# Patient Record
Sex: FEMALE | Race: WHITE | HISPANIC OR LATINO | Employment: FULL TIME | URBAN - METROPOLITAN AREA
[De-identification: names, ages, dates, MRNs, and addresses within clinical notes are randomized per-mention and may not be internally consistent; named-entity substitution may affect disease eponyms.]

---
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
## 2024-09-19 ENCOUNTER — APPOINTMENT (OUTPATIENT)
Dept: RADIOLOGY | Facility: CLINIC | Age: 19
End: 2024-09-19
Attending: STUDENT IN AN ORGANIZED HEALTH CARE EDUCATION/TRAINING PROGRAM
Payer: COMMERCIAL

## 2024-09-19 LAB
ALBUMIN UR-MCNC: 20 MG/DL
ANION GAP SERPL CALCULATED.3IONS-SCNC: 11 MMOL/L (ref 7–15)
APPEARANCE UR: CLEAR
BACTERIA #/AREA URNS HPF: ABNORMAL /HPF
BASOPHILS # BLD AUTO: 0.1 10E3/UL (ref 0–0.2)
BASOPHILS NFR BLD AUTO: 1 %
BILIRUB UR QL STRIP: NEGATIVE
BUN SERPL-MCNC: 12.4 MG/DL (ref 6–20)
CALCIUM SERPL-MCNC: 9.4 MG/DL (ref 8.8–10.4)
CHLORIDE SERPL-SCNC: 104 MMOL/L (ref 98–107)
COLOR UR AUTO: ABNORMAL
CREAT SERPL-MCNC: 0.56 MG/DL (ref 0.51–0.95)
EGFRCR SERPLBLD CKD-EPI 2021: >90 ML/MIN/1.73M2
EOSINOPHIL # BLD AUTO: 0.1 10E3/UL (ref 0–0.7)
EOSINOPHIL NFR BLD AUTO: 1 %
ERYTHROCYTE [DISTWIDTH] IN BLOOD BY AUTOMATED COUNT: 12 % (ref 10–15)
GLUCOSE SERPL-MCNC: 91 MG/DL (ref 70–99)
GLUCOSE UR STRIP-MCNC: NEGATIVE MG/DL
HCG UR QL: NEGATIVE
HCO3 SERPL-SCNC: 26 MMOL/L (ref 22–29)
HCT VFR BLD AUTO: 39.2 % (ref 35–47)
HGB BLD-MCNC: 13.6 G/DL (ref 11.7–15.7)
HGB UR QL STRIP: NEGATIVE
IMM GRANULOCYTES # BLD: 0 10E3/UL
IMM GRANULOCYTES NFR BLD: 0 %
KETONES UR STRIP-MCNC: NEGATIVE MG/DL
LEUKOCYTE ESTERASE UR QL STRIP: NEGATIVE
LYMPHOCYTES # BLD AUTO: 2.8 10E3/UL (ref 0.8–5.3)
LYMPHOCYTES NFR BLD AUTO: 37 %
MCH RBC QN AUTO: 30 PG (ref 26.5–33)
MCHC RBC AUTO-ENTMCNC: 34.7 G/DL (ref 31.5–36.5)
MCV RBC AUTO: 86 FL (ref 78–100)
MONOCYTES # BLD AUTO: 0.5 10E3/UL (ref 0–1.3)
MONOCYTES NFR BLD AUTO: 7 %
MUCOUS THREADS #/AREA URNS LPF: PRESENT /LPF
NEUTROPHILS # BLD AUTO: 4.3 10E3/UL (ref 1.6–8.3)
NEUTROPHILS NFR BLD AUTO: 55 %
NITRATE UR QL: NEGATIVE
NRBC # BLD AUTO: 0 10E3/UL
NRBC BLD AUTO-RTO: 0 /100
PH UR STRIP: 6 [PH] (ref 5–7)
PLATELET # BLD AUTO: 251 10E3/UL (ref 150–450)
POTASSIUM SERPL-SCNC: 3.7 MMOL/L (ref 3.4–5.3)
RBC # BLD AUTO: 4.54 10E6/UL (ref 3.8–5.2)
RBC URINE: 1 /HPF
SODIUM SERPL-SCNC: 141 MMOL/L (ref 135–145)
SP GR UR STRIP: 1.03 (ref 1–1.03)
SQUAMOUS EPITHELIAL: 3 /HPF
UROBILINOGEN UR STRIP-MCNC: <2 MG/DL
WBC # BLD AUTO: 7.8 10E3/UL (ref 4–11)
WBC URINE: 1 /HPF

## 2024-09-19 PROCEDURE — 81025 URINE PREGNANCY TEST: CPT | Performed by: FAMILY MEDICINE

## 2024-09-19 PROCEDURE — 99284 EMERGENCY DEPT VISIT MOD MDM: CPT

## 2024-09-19 PROCEDURE — 74019 RADEX ABDOMEN 2 VIEWS: CPT

## 2024-09-19 PROCEDURE — 85025 COMPLETE CBC W/AUTO DIFF WBC: CPT | Performed by: FAMILY MEDICINE

## 2024-09-19 PROCEDURE — 36415 COLL VENOUS BLD VENIPUNCTURE: CPT | Performed by: FAMILY MEDICINE

## 2024-09-19 PROCEDURE — 81001 URINALYSIS AUTO W/SCOPE: CPT | Performed by: FAMILY MEDICINE

## 2024-09-19 PROCEDURE — 80048 BASIC METABOLIC PNL TOTAL CA: CPT | Performed by: FAMILY MEDICINE

## 2024-09-19 ASSESSMENT — COLUMBIA-SUICIDE SEVERITY RATING SCALE - C-SSRS
2. HAVE YOU ACTUALLY HAD ANY THOUGHTS OF KILLING YOURSELF IN THE PAST MONTH?: NO
6. HAVE YOU EVER DONE ANYTHING, STARTED TO DO ANYTHING, OR PREPARED TO DO ANYTHING TO END YOUR LIFE?: NO
1. IN THE PAST MONTH, HAVE YOU WISHED YOU WERE DEAD OR WISHED YOU COULD GO TO SLEEP AND NOT WAKE UP?: NO

## 2024-09-20 ENCOUNTER — HOSPITAL ENCOUNTER (EMERGENCY)
Facility: CLINIC | Age: 19
Discharge: HOME | End: 2024-09-20
Attending: STUDENT IN AN ORGANIZED HEALTH CARE EDUCATION/TRAINING PROGRAM | Admitting: STUDENT IN AN ORGANIZED HEALTH CARE EDUCATION/TRAINING PROGRAM
Payer: COMMERCIAL

## 2024-09-20 VITALS
TEMPERATURE: 98.5 F | HEART RATE: 76 BPM | RESPIRATION RATE: 18 BRPM | DIASTOLIC BLOOD PRESSURE: 77 MMHG | SYSTOLIC BLOOD PRESSURE: 128 MMHG | BODY MASS INDEX: 21.34 KG/M2 | HEIGHT: 61 IN | WEIGHT: 113 LBS | OXYGEN SATURATION: 98 %

## 2024-09-20 DIAGNOSIS — K62.5 BRIGHT RED BLOOD PER RECTUM: ICD-10-CM

## 2024-09-20 DIAGNOSIS — K59.00 CONSTIPATION, UNSPECIFIED CONSTIPATION TYPE: ICD-10-CM

## 2024-09-20 ASSESSMENT — ACTIVITIES OF DAILY LIVING (ADL): ADLS_ACUITY_SCORE: 33

## 2024-09-20 NOTE — DISCHARGE INSTRUCTIONS
You were seen in the emergency department today for evaluation of bright red blood in your stool.  As discussed, in the setting of constipation and straining, it is common for people to form hemorrhoids.  I did an exam here today and did not see any external hemorrhoids but I suspect you likely have internal hemorrhoids that are causing the bright light blood in your stool.  Your lab work today is all unremarkable and reassuring.  Your abdominal xray found a moderate stool burden, indicating that you are constipated.  I recommend you start taking a daily fiber supplement such as Metamucil.  You can pick this up over-the-counter.  You can also try any over-the-counter stool softeners such as Colace or senna  You should also try to push fluids and stay hydrated as as it can help with more regular bowel movements.    Follow up with your primary care office next week for recheck.  Return to the emergency department if you develop any severe abdominal pain, high fevers, severe worsened bleeding, or dark liquid black stool.

## 2024-09-20 NOTE — ED PROVIDER NOTES
Emergency Department Encounter   NAME: Tamiko Lane ; AGE: 19 year old female ; YOB: 2005 ; MRN: [id number] ; PCP: Leticia Arreaga   ED PROVIDER: Deepali Green PA-C    Evaluation Date & Time:   No admission date for patient encounter.    CHIEF COMPLAINT:  Abdominal Pain        Impression and Plan   FINAL IMPRESSION:    ICD-10-CM    1. Constipation, unspecified constipation type  K59.00       2. Bright red blood per rectum  K62.5           ED Course and Medical Decision Making  Tamiko is a 19 year old female with PMH of GERD, MDD, and lactose intolerance presenting to the emergency department for evaluation of left lower quadrant pain that has been intermittent over the past month. Pain occasionally improves with a bowel movement. She states straining with bowel movements and some blood in the stool/toilet.  The past 2 days she has also had bright red blood streaked in her stool and on the toilet paper.  Endorses some pain with bowel movements as well. Denies any nausea or vomiting. Patient called nurse triage line earlier today to schedule with PCP, was sent to the ER for further evaluation. Pain at its worst is 4/10.  No history of abdominal surgery.  She states she usually has irregular periods, last was beginning of August.  No vaginal discharge or odor.  No concern for sexually transmitted infection.    Vitals reviewed and unremarkable. On exam she is resting comfortably, nontoxic-appearing.  Not pale or diaphoretic. Differential diagnosis includes but not limited to constipation, diarrhea, IBS, UTI, pyelonephritis, appendicitis, ovarian cyst, ectopic pregnancy, diverticulitis, SBO, ovarian torsion. CBC is without leukocytosis or anemia.  BMP is within normal range.  No evidence of DEBRA.  UA is without evidence of UTI or blood in the urine.  Serum hCG is negative. I performed rectal exam in the emergency department, there is no evidence of external hemorrhoid.  Internal exam was not performed to  evaluate for internal hemorrhoid.  Discussed imaging options with the patient including no imaging, abdominal x-ray, and CT. Patient would like to start with abdominal x-ray to evaluate for stool burden and SBO which I think is a reasonable start.  On exam she has very mild tenderness in the left lower quadrant but the abdomen is soft and nondistended.  She does not have any right lower quadrant tenderness.  I think appendicitis is unlikely given the left lower quadrant tenderness and the fact that her pain has been on and off for the past month or so. I think ovarian torsion is unlikely given patient endorses 4/10 pain at its max and this has been ongoing for quite some time.    Abdominal x-ray finds moderate stool burden consistent with constipation.  No free peritoneal or portal venous gas. Given patient's age and history of constipation and straining with new bright red blood per rectum I suspect she most likely has hemorrhoids causing her symptoms.  I recommended she  a stool softener over-the-counter such as Colace and use this daily until she starts having regular daily bowel movements.  She can then stop the stool softener and do daily fiber supplement to help with more regular bowel movements and to prevent/reduce hemorrhoids.  Patient has follow-up with her primary care office scheduled for next week.  She was educated on concerning symptoms that warrant return to the emergency department and is understanding and agreeable to this plan.        History:  Obtained supplemental history:Supplemental history obtained?: Documented in chart and Family Member/Significant Other  Reviewed external records: External records reviewed?: No    Complicating Factors:  Care impacted by chronic illness:N/A  Care significantly affected by social determinants of health:N/A    Work Up:  Did you consider but not order tests?: In addition to work-up documented, I considered the following work up: CT abdomen pelvis,  transvaginal ultrasound with Doppler  Did you interpret images independently?: Independent interpretation of ECG and images noted in documentation, when applicable.    External Consults:  Consultation discussion with other provider:Did you involve another provider (consultant, , pharmacy, etc.)?: No  Discharge. No recommendations on prescription strength medication(s). See documentation for any additional details.  I considered escalation of care and admitting the patient but ultimately did not given reassuring exam and workup.          ED COURSE:  9:05 PM I met and introduced myself to the patient. I gathered initial history and performed my physical exam. We discussed plan for initial workup.   12:55 AM I rechecked the patient and discussed results, discharge, follow up, and reasons to return to the ED.     At the conclusion of the encounter I discussed the results of all the tests and the disposition. The questions were answered. The patient or family acknowledged understanding and was agreeable with the care plan.      MEDICATIONS GIVEN IN THE EMERGENCY DEPARTMENT:  Medications - No data to display      NEW PRESCRIPTIONS STARTED AT TODAY'S ED VISIT:  New Prescriptions    No medications on file         HPI   Patient information was obtained from: patient  Use of Intrepreter: N/A     Tamiko is a 19 year old female with PMH of GERD, MDD, and lactose intolerance presenting to the emergency department for evaluation of left lower quadrant pain that has been intermittent over the past month. Pain occasionally improves with a bowel movement. She states straining with bowel movements and some blood in the stool/toilet.  The past 2 days she has also had bright red blood streaked in her stool and on the toilet paper.  Endorses some pain with bowel movements as well. Denies any nausea or vomiting. Patient called nurse triage line earlier today to schedule with PCP, was sent to the ER for further evaluation. Pain at its  "worst is 4/10.  No history of abdominal surgery.  She states she usually has irregular periods, last was beginning of August.  No vaginal discharge or odor.  No concern for sexually transmitted infection.      Medical History     No past medical history on file.    No past surgical history on file.    No family history on file.    Social History     Tobacco Use    Smoking status: Never    Smokeless tobacco: Never   Substance Use Topics    Alcohol use: Yes    Drug use: Yes       No current outpatient medications on file.        Physical Exam     First Vitals:  Patient Vitals for the past 24 hrs:   BP Temp Pulse Resp SpO2 Height Weight   09/19/24 2103 128/77 98.5  F (36.9  C) 78 19 98 % 1.549 m (5' 1\") 51.3 kg (113 lb)         PHYSICAL EXAM    General Appearance:  Alert, cooperative, no distress, appears stated age  HENT: Normocephalic without obvious deformity, atraumatic. Mucous membranes moist   Eyes: Conjunctiva clear, Lids normal. No discharge.   Respiratory: No distress. Lungs clear to ausculation bilaterally. No wheezes, rhonchi or stridor  Cardiovascular: Regular rate and rhythm, no murmur. Normal cap refill. No peripheral edema  GI: Abdomen soft, nondistended.  She has mild reproducible tenderness in the left lower quadrant, no other areas of tenderness.  : Mild left-sided CVA tenderness, no right-sided CVA tenderness.  External rectal exam does not find any evidence of hemorrhoids.  Musculoskeletal: Moving all extremities. No gross deformities  Integument: Warm, dry, no rashes or lesions  Neurologic: Alert and orientated x3. No focal deficits.  Psych: Normal mood and affect        Results     LAB:  All pertinent labs reviewed and interpreted  Labs Ordered and Resulted from Time of ED Arrival to Time of ED Departure   ROUTINE UA WITH MICROSCOPIC REFLEX TO CULTURE - Abnormal       Result Value    Color Urine Light Yellow      Appearance Urine Clear      Glucose Urine Negative      Bilirubin Urine Negative   "    Ketones Urine Negative      Specific Gravity Urine 1.029      Blood Urine Negative      pH Urine 6.0      Protein Albumin Urine 20 (*)     Urobilinogen Urine <2.0      Nitrite Urine Negative      Leukocyte Esterase Urine Negative      Bacteria Urine Moderate (*)     Mucus Urine Present (*)     RBC Urine 1      WBC Urine 1      Squamous Epithelials Urine 3 (*)    BASIC METABOLIC PANEL - Normal    Sodium 141      Potassium 3.7      Chloride 104      Carbon Dioxide (CO2) 26      Anion Gap 11      Urea Nitrogen 12.4      Creatinine 0.56      GFR Estimate >90      Calcium 9.4      Glucose 91     HCG QUALITATIVE URINE - Normal    hCG Urine Qualitative Negative     CBC WITH PLATELETS AND DIFFERENTIAL    WBC Count 7.8      RBC Count 4.54      Hemoglobin 13.6      Hematocrit 39.2      MCV 86      MCH 30.0      MCHC 34.7      RDW 12.0      Platelet Count 251      % Neutrophils 55      % Lymphocytes 37      % Monocytes 7      % Eosinophils 1      % Basophils 1      % Immature Granulocytes 0      NRBCs per 100 WBC 0      Absolute Neutrophils 4.3      Absolute Lymphocytes 2.8      Absolute Monocytes 0.5      Absolute Eosinophils 0.1      Absolute Basophils 0.1      Absolute Immature Granulocytes 0.0      Absolute NRBCs 0.0         RADIOLOGY:  Abdomen XR, 2 vw, flat and upright   Preliminary Result   IMPRESSION: Upright and supine views of the abdomen and pelvis were obtained. Moderate amount of stool in the colon, nonspecific, can be seen with constipation. No free peritoneal or portal venous gas.            ECG:  N/A      PROCEDURES:  N/A      Deepali Green PA-C   Emergency Medicine   [facility] EMERGENCY ROOM       Deepali Green PA-C  09/20/24 0058

## 2024-09-20 NOTE — ED TRIAGE NOTES
Pt presents to ED with c/o LLQ abdominal pain that has been intermittent over the past month.  Reports some straining with bowel movements and has had some bloody in stool/toilet.  Denies nausea or vomiting.      Triage Assessment (Adult)       Row Name 09/19/24 5404          Triage Assessment    Airway WDL WDL        Respiratory WDL    Respiratory WDL WDL        Skin Circulation/Temperature WDL    Skin Circulation/Temperature WDL WDL        Cardiac WDL    Cardiac WDL WDL        Peripheral/Neurovascular WDL    Peripheral Neurovascular WDL WDL        Cognitive/Neuro/Behavioral WDL    Cognitive/Neuro/Behavioral WDL WDL